# Patient Record
Sex: MALE | Race: WHITE | NOT HISPANIC OR LATINO | Employment: FULL TIME | URBAN - METROPOLITAN AREA
[De-identification: names, ages, dates, MRNs, and addresses within clinical notes are randomized per-mention and may not be internally consistent; named-entity substitution may affect disease eponyms.]

---

## 2019-01-04 ENCOUNTER — TRANSFERRED RECORDS (OUTPATIENT)
Dept: HEALTH INFORMATION MANAGEMENT | Facility: CLINIC | Age: 44
End: 2019-01-04

## 2019-01-07 ENCOUNTER — TRANSFERRED RECORDS (OUTPATIENT)
Dept: HEALTH INFORMATION MANAGEMENT | Facility: CLINIC | Age: 44
End: 2019-01-07

## 2019-01-14 ENCOUNTER — TRANSFERRED RECORDS (OUTPATIENT)
Dept: HEALTH INFORMATION MANAGEMENT | Facility: CLINIC | Age: 44
End: 2019-01-14

## 2019-02-06 ENCOUNTER — TRANSFERRED RECORDS (OUTPATIENT)
Dept: HEALTH INFORMATION MANAGEMENT | Facility: CLINIC | Age: 44
End: 2019-02-06

## 2019-02-08 ENCOUNTER — MEDICAL CORRESPONDENCE (OUTPATIENT)
Dept: HEALTH INFORMATION MANAGEMENT | Facility: CLINIC | Age: 44
End: 2019-02-08

## 2019-02-19 NOTE — TELEPHONE ENCOUNTER
Action    Action Taken Records received from [facility] via fax - 32 pages. Records forwarded to MS gokul Gonzales.

## 2019-02-19 NOTE — TELEPHONE ENCOUNTER
RECORDS RECEIVED FROM: External - Dr. Angel Kirkland - [facility]   DATE RECEIVED: 3/5/19   NOTES (FOR ALL VISITS) STATUS DETAILS   OFFICE NOTE from referring provider Received 1/2/19 1/7/19   OFFICE NOTE from other specialist N/A    DISCHARGE SUMMARY from hospital N/A    DISCHARGE REPORT from the ER N/A    OPERATIVE REPORT N/A    MEDICATION LIST Received    IMAGING  (FOR ALL VISITS)     EMG N/A    EEG N/A    ECT N/A    MRI (HEAD, NECK, SPINE) In Process (images)  Received (reports) MRI Head 1/4/19  MRI Lumbar Spine 1/14/19  MRI Thoracic Spine 1/14/19  MRI Cervical Spine 1/14/19   CT (HEAD, NECK, SPINE) N/A      Phone Call:     Contact Name [facility] Medical Records   Outcome Records were faxed to our referrals department on 2/8 per . Requested to re-fax the records to the clinic instead.      Requested images be mailed. Was told to fax in a cover sheet with the request to [phone]. Faxed in request.

## 2019-02-25 NOTE — TELEPHONE ENCOUNTER
Imaging Received     Image Type (x): Disc: X  PACS:    Exam Date/Name MRI Head 1/4/19  MRI Lumbar Spine 1/14/19  MRI Thoracic Spine 1/14/19  MRI Cervical Spine 1/14/19 Comments: Imaging disk received via mail. Sent to scanning.

## 2019-03-05 ENCOUNTER — PRE VISIT (OUTPATIENT)
Dept: NEUROLOGY | Facility: CLINIC | Age: 44
End: 2019-03-05

## 2019-03-05 ENCOUNTER — OFFICE VISIT (OUTPATIENT)
Dept: NEUROLOGY | Facility: CLINIC | Age: 44
End: 2019-03-05
Attending: PSYCHIATRY & NEUROLOGY
Payer: COMMERCIAL

## 2019-03-05 VITALS — SYSTOLIC BLOOD PRESSURE: 135 MMHG | WEIGHT: 204.3 LBS | HEART RATE: 84 BPM | DIASTOLIC BLOOD PRESSURE: 83 MMHG

## 2019-03-05 DIAGNOSIS — H47.9 UNSPECIFIED DISORDER OF VISUAL PATHWAYS: ICD-10-CM

## 2019-03-05 DIAGNOSIS — H47.9 UNSPECIFIED DISORDER OF VISUAL PATHWAYS: Primary | ICD-10-CM

## 2019-03-05 DIAGNOSIS — M79.2 NEUROPATHIC PAIN: ICD-10-CM

## 2019-03-05 DIAGNOSIS — R93.0 ABNORMAL MRI OF HEAD: ICD-10-CM

## 2019-03-05 LAB
ALBUMIN SERPL-MCNC: 4 G/DL (ref 3.4–5)
ALP SERPL-CCNC: 73 U/L (ref 40–150)
ALT SERPL W P-5'-P-CCNC: 35 U/L (ref 0–70)
ANION GAP SERPL CALCULATED.3IONS-SCNC: 6 MMOL/L (ref 3–14)
AST SERPL W P-5'-P-CCNC: 22 U/L (ref 0–45)
BASOPHILS # BLD AUTO: 0 10E9/L (ref 0–0.2)
BASOPHILS NFR BLD AUTO: 0.5 %
BILIRUB SERPL-MCNC: 0.4 MG/DL (ref 0.2–1.3)
BUN SERPL-MCNC: 18 MG/DL (ref 7–30)
CALCIUM SERPL-MCNC: 9 MG/DL (ref 8.5–10.1)
CHLORIDE SERPL-SCNC: 105 MMOL/L (ref 94–109)
CK SERPL-CCNC: 97 U/L (ref 30–300)
CO2 SERPL-SCNC: 28 MMOL/L (ref 20–32)
CREAT SERPL-MCNC: 1.06 MG/DL (ref 0.66–1.25)
CRP SERPL-MCNC: <2.9 MG/L (ref 0–8)
DIFFERENTIAL METHOD BLD: NORMAL
EOSINOPHIL # BLD AUTO: 0.1 10E9/L (ref 0–0.7)
EOSINOPHIL NFR BLD AUTO: 1.3 %
ERYTHROCYTE [DISTWIDTH] IN BLOOD BY AUTOMATED COUNT: 12.5 % (ref 10–15)
ERYTHROCYTE [SEDIMENTATION RATE] IN BLOOD BY WESTERGREN METHOD: 4 MM/H (ref 0–15)
GFR SERPL CREATININE-BSD FRML MDRD: 84 ML/MIN/{1.73_M2}
GLUCOSE SERPL-MCNC: 86 MG/DL (ref 70–99)
HBV CORE AB SERPL QL IA: NONREACTIVE
HBV SURFACE AB SERPL IA-ACNC: 4.21 M[IU]/ML
HBV SURFACE AG SERPL QL IA: NONREACTIVE
HCT VFR BLD AUTO: 45.8 % (ref 40–53)
HGB BLD-MCNC: 14.9 G/DL (ref 13.3–17.7)
HIV 1+2 AB+HIV1 P24 AG SERPL QL IA: NONREACTIVE
IMM GRANULOCYTES # BLD: 0 10E9/L (ref 0–0.4)
IMM GRANULOCYTES NFR BLD: 0.2 %
LYMPHOCYTES # BLD AUTO: 1.6 10E9/L (ref 0.8–5.3)
LYMPHOCYTES NFR BLD AUTO: 28.1 %
MCH RBC QN AUTO: 30.3 PG (ref 26.5–33)
MCHC RBC AUTO-ENTMCNC: 32.5 G/DL (ref 31.5–36.5)
MCV RBC AUTO: 93 FL (ref 78–100)
MISCELLANEOUS TEST: NORMAL
MONOCYTES # BLD AUTO: 0.3 10E9/L (ref 0–1.3)
MONOCYTES NFR BLD AUTO: 6.2 %
NEUTROPHILS # BLD AUTO: 3.5 10E9/L (ref 1.6–8.3)
NEUTROPHILS NFR BLD AUTO: 63.7 %
NRBC # BLD AUTO: 0 10*3/UL
NRBC BLD AUTO-RTO: 0 /100
PLATELET # BLD AUTO: 215 10E9/L (ref 150–450)
POTASSIUM SERPL-SCNC: 4.4 MMOL/L (ref 3.4–5.3)
PROT SERPL-MCNC: 7.2 G/DL (ref 6.8–8.8)
RBC # BLD AUTO: 4.92 10E12/L (ref 4.4–5.9)
SODIUM SERPL-SCNC: 139 MMOL/L (ref 133–144)
TSH SERPL DL<=0.005 MIU/L-ACNC: 1.29 MU/L (ref 0.4–4)
WBC # BLD AUTO: 5.5 10E9/L (ref 4–11)

## 2019-03-05 PROCEDURE — 84443 ASSAY THYROID STIM HORMONE: CPT | Performed by: PSYCHIATRY & NEUROLOGY

## 2019-03-05 PROCEDURE — 86787 VARICELLA-ZOSTER ANTIBODY: CPT | Performed by: PSYCHIATRY & NEUROLOGY

## 2019-03-05 PROCEDURE — 82784 ASSAY IGA/IGD/IGG/IGM EACH: CPT | Performed by: PSYCHIATRY & NEUROLOGY

## 2019-03-05 PROCEDURE — 82550 ASSAY OF CK (CPK): CPT | Performed by: PSYCHIATRY & NEUROLOGY

## 2019-03-05 PROCEDURE — 82525 ASSAY OF COPPER: CPT | Performed by: PSYCHIATRY & NEUROLOGY

## 2019-03-05 PROCEDURE — 86706 HEP B SURFACE ANTIBODY: CPT | Performed by: PSYCHIATRY & NEUROLOGY

## 2019-03-05 PROCEDURE — 86140 C-REACTIVE PROTEIN: CPT | Performed by: PSYCHIATRY & NEUROLOGY

## 2019-03-05 PROCEDURE — 85652 RBC SED RATE AUTOMATED: CPT | Performed by: PSYCHIATRY & NEUROLOGY

## 2019-03-05 PROCEDURE — 85025 COMPLETE CBC W/AUTO DIFF WBC: CPT | Performed by: PSYCHIATRY & NEUROLOGY

## 2019-03-05 PROCEDURE — 40000975 ZZHCL STATISTIC JC VIR AB INDEX INHIB: Performed by: PSYCHIATRY & NEUROLOGY

## 2019-03-05 PROCEDURE — 86704 HEP B CORE ANTIBODY TOTAL: CPT | Performed by: PSYCHIATRY & NEUROLOGY

## 2019-03-05 PROCEDURE — G0463 HOSPITAL OUTPT CLINIC VISIT: HCPCS

## 2019-03-05 PROCEDURE — 86480 TB TEST CELL IMMUN MEASURE: CPT | Performed by: PSYCHIATRY & NEUROLOGY

## 2019-03-05 PROCEDURE — 87522 HEPATITIS C REVRS TRNSCRPJ: CPT | Performed by: PSYCHIATRY & NEUROLOGY

## 2019-03-05 PROCEDURE — 86255 FLUORESCENT ANTIBODY SCREEN: CPT | Performed by: PSYCHIATRY & NEUROLOGY

## 2019-03-05 PROCEDURE — 36415 COLL VENOUS BLD VENIPUNCTURE: CPT | Performed by: PSYCHIATRY & NEUROLOGY

## 2019-03-05 PROCEDURE — 82164 ANGIOTENSIN I ENZYME TEST: CPT | Performed by: PSYCHIATRY & NEUROLOGY

## 2019-03-05 PROCEDURE — 82085 ASSAY OF ALDOLASE: CPT | Performed by: PSYCHIATRY & NEUROLOGY

## 2019-03-05 PROCEDURE — 87340 HEPATITIS B SURFACE AG IA: CPT | Performed by: PSYCHIATRY & NEUROLOGY

## 2019-03-05 PROCEDURE — 83516 IMMUNOASSAY NONANTIBODY: CPT | Performed by: PSYCHIATRY & NEUROLOGY

## 2019-03-05 PROCEDURE — 84999 UNLISTED CHEMISTRY PROCEDURE: CPT | Performed by: PSYCHIATRY & NEUROLOGY

## 2019-03-05 PROCEDURE — 80053 COMPREHEN METABOLIC PANEL: CPT | Performed by: PSYCHIATRY & NEUROLOGY

## 2019-03-05 PROCEDURE — 83876 ASSAY MYELOPEROXIDASE: CPT | Performed by: PSYCHIATRY & NEUROLOGY

## 2019-03-05 PROCEDURE — 87389 HIV-1 AG W/HIV-1&-2 AB AG IA: CPT | Performed by: PSYCHIATRY & NEUROLOGY

## 2019-03-05 RX ORDER — GABAPENTIN 300 MG/1
300 CAPSULE ORAL DAILY
COMMUNITY
Start: 2019-02-08 | End: 2019-04-02

## 2019-03-05 RX ORDER — DULOXETIN HYDROCHLORIDE 20 MG/1
20 CAPSULE, DELAYED RELEASE ORAL 2 TIMES DAILY
COMMUNITY
Start: 2019-01-02

## 2019-03-05 RX ORDER — PREGABALIN 50 MG/1
50 CAPSULE ORAL AT BEDTIME
Qty: 30 CAPSULE | Refills: 3 | Status: SHIPPED | OUTPATIENT
Start: 2019-03-05 | End: 2019-10-08

## 2019-03-05 RX ORDER — PREGABALIN 50 MG/1
50 CAPSULE ORAL 3 TIMES DAILY
Qty: 30 CAPSULE | Refills: 3 | Status: SHIPPED | OUTPATIENT
Start: 2019-03-05 | End: 2019-03-05

## 2019-03-05 RX ORDER — AMPICILLIN TRIHYDRATE 500 MG
1 CAPSULE ORAL DAILY
COMMUNITY

## 2019-03-05 RX ORDER — IBUPROFEN 800 MG/1
800 TABLET, FILM COATED ORAL 2 TIMES DAILY PRN
COMMUNITY

## 2019-03-05 SDOH — HEALTH STABILITY: MENTAL HEALTH: HOW OFTEN DO YOU HAVE A DRINK CONTAINING ALCOHOL?: NEVER

## 2019-03-05 ASSESSMENT — PAIN SCALES - GENERAL: PAINLEVEL: MODERATE PAIN (4)

## 2019-03-05 NOTE — LETTER
3/5/2019       RE: Adilson Burton  [id number] [facility] [address]     Dear Colleague,    Thank you for referring your patient, Adilson Burton, to the [facility] at [facility]. Please see a copy of my visit note below.    [facility]: [place]    General Neurology Clinic History and Physical    Patient Name:  Adilson Burton  MRN:  [id number]    :  1975  Date of Admission:  (Not on file)  Date of Service:  2019  Primary care provider:  Angel Kirkland    History of Present Illness: 43Mhx of gastric bypass, C4-C6 fusion here for f/u for abnormal MRI. The patient presented initially for diffuse body pains for which an MRI brain was obtained showing an incidental 6.5x9.5mm periventricular T2 hyperintense, T1 hypointense lesion c/f demyelination. The patient was referred here for evaluation. In retrospect, the patient also notes a few years history of ambulation difficulties, namely legs giving out while running and mild imbalance, as well as dropping things with his hands. He says these have gradually come on but that he has never had any discrete episodes of acute onset neurologic symptoms. Over the last few years he has also had some mild L > R stabbing eye pains that seem to be in relation to headaches. His vision has been worsening in both eyes over the last few years as well, which he knows because his prescription keeps changing. He also notes years long hx of fatigue. Other than the above, he denies any neurologic symptoms including bowel/bladder issues, weakness, numbness. For workup, he had a C, T, and L spine MRI series done that per report is unremarkable. We are unable to load the disc here in clinic to view any of his MRIs and only have the reports. He had CSF drawn showing normal basic studies but 8 OCBs with only 2 bands in the serum.     The patient says he did have Lyme disease many years ago that onset  with bullseye rash and severe migraine headache, for which he was treated. He has an identical twin brother who was dx with PPMS in the 1990s that he says developed after an anthrax injection who also f/w Dr. Costello for his PPMS. He quit smoking 2 years ago and is employed as a .     He also was put on GBP for his body pains but says he has had tremors at night so was requesting PGB.    ROS: A 10-point ROS is negative unless indicated above.      Allergies:  No Known Allergies    Medications:      (Not in a hospital admission)    PMH:  History reviewed. No pertinent past medical history.  History reviewed. No pertinent surgical history.    Social History:  Social History     Tobacco Use     Smoking status: Former Smoker     Smokeless tobacco: Never Used   Substance Use Topics     Alcohol use: No     Frequency: Never       Family History:    History reviewed. No pertinent family history.    Physical Examination:   Vitals:  B/P: 135/83, T: Data Unavailable, P: 84, R: Data Unavailable  General:  Sitting up  HEENT:  NC/AT, no icterus, op pink and moist, no ear or nose drainage.   Cardiac:  RRR, no m/r/g  Chest:  CTAB  Abdomen:  S/NT/ND  Extremities:  No LE swelling.    Skin:  No rash or lesion.      Neuro Exam:  Mental status: AOx4, speech fluent  Cranial nerves: EOMI no nystagmus, VFF, face symmetric and equal to pp, voice clear  Motor: 5/5 diffusely including hands.   Reflexes: 2+ diffusely, no spread anywhere, neg hoffmans.   Sensory: loss of pp of BL hands and BL medial lower legs. Neg Romberg  Coordination: FTN intact BL  Gait: normal gait, able to walk HTT    Investigations:  I have reviewed all pertinent investigations.     ==========================================================    ASSESSMENT/PLAN: 43Mhx of gastric bypass, C4-C6 fusion here for f/u for abnormal MRI.    ## possible PPMS: technically, if all other autoimmune conditions are ruled out, then he does meet minimal criteria for PPMS  despite the fact that his symptoms are very mild. We discussed that he has a 31% chance of having MS based on his brother's diagnosis. We will do blood work today to look for other potential causes. He also needs an OCT. Will have the patient f/u with us in one month after blood work for a final diagnosis. We also need to review the MRI.  --upload MRI disc to PAX  --begin pregabalin 50mg at bedtime  --can stop GBP  --labs: aldolase, ACE, CBC, CK, CMP, copper, CRP/ESR, HBV, HCV, IgA, IgG, IgM, VIC, Bueno demyelinating panel, quantiferon, TSH, VZV, vasculitis panel  --OCT of both eyes  --probably needs to see a rheumatologist as much of his pain is not typical for MS and sounds musculoskeletal.   --RTC 1 month    =========================================================    This patient was seen & discussed with my attending, Dr. Costello, who agrees with my assessment and plan.     Dayday Flores  PGY4 Neurology  [phone]    I saw and examined this patient, and have read and edited the resident's note.  I agree with the findings, assessment, and plan.    Sandip Costello  Staff Neurologist   03/05/19

## 2019-03-05 NOTE — NURSING NOTE
Received 2 imaging disk from patient. Send imaging to film room to be uploaded to PACS- disk and forms place in bin to be delivered to film room  Angelita Graff MA

## 2019-03-05 NOTE — PROGRESS NOTES
[facility]: [place]    General Neurology Clinic History and Physical    Patient Name:  Adilson Burton  MRN:  [id number]    :  1975  Date of Admission:  (Not on file)  Date of Service:  2019  Primary care provider:  Angel Kirkland    History of Present Illness: 43Mhx of gastric bypass, C4-C6 fusion here for f/u for abnormal MRI. The patient presented initially for diffuse body pains for which an MRI brain was obtained showing an incidental 6.5x9.5mm periventricular T2 hyperintense, T1 hypointense lesion c/f demyelination. The patient was referred here for evaluation. In retrospect, the patient also notes a few years history of ambulation difficulties, namely legs giving out while running and mild imbalance, as well as dropping things with his hands. He says these have gradually come on but that he has never had any discrete episodes of acute onset neurologic symptoms. Over the last few years he has also had some mild L > R stabbing eye pains that seem to be in relation to headaches. His vision has been worsening in both eyes over the last few years as well, which he knows because his prescription keeps changing. He also notes years long hx of fatigue. Other than the above, he denies any neurologic symptoms including bowel/bladder issues, weakness, numbness. For workup, he had a C, T, and L spine MRI series done that per report is unremarkable. We are unable to load the disc here in clinic to view any of his MRIs and only have the reports. He had CSF drawn showing normal basic studies but 8 OCBs with only 2 bands in the serum.     The patient says he did have Lyme disease many years ago that onset with bullseye rash and severe migraine headache, for which he was treated. He has an identical twin brother who was dx with PPMS in the  that he says developed after an anthrax injection who also f/w Dr. Costello for his PPMS. He quit smoking 2 years ago and is employed as a  .     He also was put on GBP for his body pains but says he has had tremors at night so was requesting PGB.    ROS: A 10-point ROS is negative unless indicated above.      Allergies:  No Known Allergies    Medications:      (Not in a hospital admission)    PMH:  History reviewed. No pertinent past medical history.  History reviewed. No pertinent surgical history.    Social History:  Social History     Tobacco Use     Smoking status: Former Smoker     Smokeless tobacco: Never Used   Substance Use Topics     Alcohol use: No     Frequency: Never       Family History:    History reviewed. No pertinent family history.    Physical Examination:   Vitals:  B/P: 135/83, T: Data Unavailable, P: 84, R: Data Unavailable  General:  Sitting up  HEENT:  NC/AT, no icterus, op pink and moist, no ear or nose drainage.   Cardiac:  RRR, no m/r/g  Chest:  CTAB  Abdomen:  S/NT/ND  Extremities:  No LE swelling.    Skin:  No rash or lesion.      Neuro Exam:  Mental status: AOx4, speech fluent  Cranial nerves: EOMI no nystagmus, VFF, face symmetric and equal to pp, voice clear  Motor: 5/5 diffusely including hands.   Reflexes: 2+ diffusely, no spread anywhere, neg hoffmans.   Sensory: loss of pp of BL hands and BL medial lower legs. Neg Romberg  Coordination: FTN intact BL  Gait: normal gait, able to walk HTT    Investigations:  I have reviewed all pertinent investigations.     ==========================================================    ASSESSMENT/PLAN: 43Mhx of gastric bypass, C4-C6 fusion here for f/u for abnormal MRI.    ## possible PPMS: technically, if all other autoimmune conditions are ruled out, then he does meet minimal criteria for PPMS despite the fact that his symptoms are very mild. We discussed that he has a 31% chance of having MS based on his brother's diagnosis. We will do blood work today to look for other potential causes. He also needs an OCT. Will have the patient f/u with us in one month after blood  work for a final diagnosis. We also need to review the MRI.  --upload MRI disc to PAX  --begin pregabalin 50mg at bedtime  --can stop GBP  --labs: aldolase, ACE, CBC, CK, CMP, copper, CRP/ESR, HBV, HCV, IgA, IgG, IgM, VIC, Bueno demyelinating panel, quantiferon, TSH, VZV, vasculitis panel  --OCT of both eyes  --probably needs to see a rheumatologist as much of his pain is not typical for MS and sounds musculoskeletal.   --RTC 1 month    =========================================================    This patient was seen & discussed with my attending, Dr. Costello, who agrees with my assessment and plan.     Dayday Flores  PGY4 Neurology  [phone]       I saw and examined this patient, and have read and edited the resident's note.  I agree with the findings, assessment, and plan.    Sandip Costello  Staff Neurologist   03/05/19

## 2019-03-05 NOTE — PATIENT INSTRUCTIONS
Stop gabapentin     Start Lyrica    Will get blood work    Get an OCT Ocular Coherence topography    You saw a neurology provider today at the [facility].  You may have also met with the MS RN Care Coordinator.  In order to get a message to your MS Center provider, you should contact [phone] option 3 for the triage nurse line.    You should contact us via this protocol if you have any of the following symptoms:    New or worsening neurologic symptoms that persist for 24-48 hours, such as:  o New onset of pain or marked worsening of pain  o Difficulty with speech, swallowing, or breathing  o New onset of vertigo or dizziness  o Change in bowel or bladder function (incontinence, difficulty urinating)    Increasing difficulty in self care    Marked changes in vision (double vision, blurred vision, graying of vision)    Change in mobility    Change in cognitive function    Falling    Worsening numbness, tingling or pain with a change in function    Worsening fatigue lasting more than 2 weeks  If you had labs completed today, we will contact you with the results.  If you are active in [facility], they will be released to you there.  Otherwise, your results will be provided to you via mail or telephone call.  Some results take up to 2 weeks for completion.  If you haven t heard anything about your lab results within 2 weeks, you can call or send a [facility] [facility] message to obtain your results.  If you have an MRI scheduled in the week or two prior to your next appointment, we will go over the results at your scheduled follow up appointment.  If you are not scheduled to see your MS Center provider within about 2 weeks after your MRI, please call or send a [employer] message to obtain your results if you haven t heard anything within 2 weeks.  Please be aware that it takes at least 5 business days after routine MRIs for your results to be reviewed by both the radiologist and your doctor.  MRIs  completed at facilities outside of the [facility] system take about 2 weeks in order for the MRI disc to be mailed to our clinic and uploaded into your medical record.    Prescription refills should be faxed to us by your pharmacy.  Our fax number for prescription refills is [phone].  Please do your best to come to your appointments, and to arrive 15 minutes early to allow time for checking in.  [facility] reserves the right to terminate care of established patients if a patient misses three or more appointments in a clinic without providing notification within a 12-month period.    Developing Your Care Team  Individuals living with chronic illnesses like MS may be unaware that they are at risk for the same range of medical problems as everyone else.  This is why you must establish a relationship with other health care providers in addition to your Multiple Sclerosis doctor.  It can be difficult at times to figure out whether a health concern is related to your MS, or whether it is related to something else, such as hormonal changes, pseduoexacerbations, changes in your core body temperature, flu-like reactions to interferons, exercise, or infections.  Urinary tract infections (UTIs) are common culprits that can cause fatigue, weakness, or other  MS attack -like symptoms without classic symptoms of a UTI.  For this reason, if you call or come in to discuss symptoms, you may be asked to get in touch with your primary provider or another specialist, so that you receive the comprehensive care you need.  What is Multiple Sclerosis (MS)?  MS is a disease in which the nerve tissues in the brain and/or spinal cord are attacked by immune cells in the body.  These immune cells are present in everyone, and their normal role is to fight off infections.  In people with MS, these cells change the way they function and cross into the nervous system.  Once there, they cause inflammation that damages the  myelin (or the protective coating of a nerve cell, much like the plastic covering on an electrical cord) and parts of the nerve cell itself.  So far, a clear cause for this immune system dysfunction has not been found.  MS often starts out as the  relapsing-remitting  form.  This means there are episodes when you have symptoms, and other times when you recover to normal or near-normal.  Over time, if the damage to the nervous system continues, the disease can cause additional disability, such as difficulty walking.  If the relapses and nerve damage can be prevented with available medications, many patients with MS can go many years between relapses and have relatively little disability.  Remember: MS is a condition that changes and must be evaluated on an ongoing basis!  What is a Relapse? (Also called flare-ups, attacks, or exacerbations)  Relapses are due to the occurrence of inflammation in some part of the brain and/or spinal cord.  A relapse is new or recurrent symptoms which persist for at least 24 hours and sometimes worsen over 48 hours.  New symptoms need to be  by at least 1 month in order to be considered separate relapses. Most of the time, symptoms reach their maximal intensity within 2 weeks and then begin to slowly resolve.  At times, your symptoms may not recover fully for up to 6 months, depending on the severity of the episode.  The frequency of relapses is generally higher early in the disease, but can vary greatly among individuals with MS.  Improvement of symptoms for an individual is unpredictable with each relapse.    It is important to remember that an increase in symptoms and changes in function may not necessarily be a relapse.  There are other factors that contribute to such changes, such as hot weather, increased body temperature, infection/illness, stress and sleep deprivation.  The worsening of symptoms may feel like a relapse when in reality it is not.  These episodes are  referred to as pseudorelapses.  Once the underlying cause is addressed, symptoms usually fade away and you feel better.  If you experience a worsening of symptoms that lasts more than 48 hours and does not improve with cooling down, decreasing stress, or treatment of an infection, please call us and we can help to better determine whether you are having a pseudorelapse versus a relapse.

## 2019-03-06 LAB
ACE SERPL-CCNC: 12 U/L (ref 9–67)
ALDOLASE SERPL-CCNC: 3.5 U/L (ref 1.5–8.1)
IGA SERPL-MCNC: 195 MG/DL (ref 70–380)
IGG SERPL-MCNC: 925 MG/DL (ref 695–1620)
IGM SERPL-MCNC: 83 MG/DL (ref 60–265)
MYELOPEROXIDASE AB SER-ACNC: <0.2 AI (ref 0–0.9)
PROTEINASE3 IGG SER-ACNC: <0.2 AI (ref 0–0.9)
VZV IGG SER QL IA: 6.1 AI (ref 0–0.8)

## 2019-03-07 LAB
COPPER SERPL-MCNC: 72 UG/DL (ref 70–140)
HCV RNA SERPL NAA+PROBE-ACNC: <15 [IU]/ML
HCV RNA SERPL NAA+PROBE-LOG IU: <1.2 LOG IU/ML

## 2019-03-08 LAB
GAMMA INTERFERON BACKGROUND BLD IA-ACNC: 0.03 IU/ML
M TB IFN-G BLD-IMP: NEGATIVE
M TB IFN-G CD4+ BCKGRND COR BLD-ACNC: 7.68 IU/ML
MITOGEN IGNF BCKGRD COR BLD-ACNC: 0 IU/ML
MITOGEN IGNF BCKGRD COR BLD-ACNC: 0 IU/ML

## 2019-03-11 LAB — LAB SCANNED RESULT: ABNORMAL

## 2019-03-12 LAB
RESULT: NORMAL
SEND OUTS MISC TEST CODE: NORMAL
SEND OUTS MISC TEST SPECIMEN: NORMAL
TEST NAME: NORMAL

## 2019-04-02 ENCOUNTER — OFFICE VISIT (OUTPATIENT)
Dept: NEUROLOGY | Facility: CLINIC | Age: 44
End: 2019-04-02
Attending: PSYCHIATRY & NEUROLOGY
Payer: COMMERCIAL

## 2019-04-02 VITALS
BODY MASS INDEX: 31.31 KG/M2 | HEIGHT: 68 IN | WEIGHT: 206.6 LBS | HEART RATE: 82 BPM | SYSTOLIC BLOOD PRESSURE: 133 MMHG | DIASTOLIC BLOOD PRESSURE: 82 MMHG

## 2019-04-02 DIAGNOSIS — G35 MULTIPLE SCLEROSIS (H): Primary | ICD-10-CM

## 2019-04-02 PROCEDURE — G0463 HOSPITAL OUTPT CLINIC VISIT: HCPCS | Mod: ZF

## 2019-04-02 RX ORDER — PREGABALIN 25 MG/1
CAPSULE ORAL
Qty: 240 CAPSULE | Refills: 3 | Status: SHIPPED | OUTPATIENT
Start: 2019-04-02 | End: 2019-10-08

## 2019-04-02 ASSESSMENT — MIFFLIN-ST. JEOR: SCORE: 1788.69

## 2019-04-02 ASSESSMENT — PAIN SCALES - GENERAL: PAINLEVEL: MODERATE PAIN (4)

## 2019-04-02 NOTE — PATIENT INSTRUCTIONS
Increase the dose of lyrica by 25mg every three days as tolerated until you either reach of dose of no symptoms or you hit 100mg twice daily    Call me a month in about the lyrica.    Call me if you decide to go the medication     Follow up in 6 months    You saw a neurology provider today at the [facility].  You may have also met with the MS RN Care Coordinator.  In order to get a message to your MS Center provider, you should contact [phone] option 3 for the triage nurse line.    You should contact us via this protocol if you have any of the following symptoms:    New or worsening neurologic symptoms that persist for 24-48 hours, such as:  o New onset of pain or marked worsening of pain  o Difficulty with speech, swallowing, or breathing  o New onset of vertigo or dizziness  o Change in bowel or bladder function (incontinence, difficulty urinating)    Increasing difficulty in self care    Marked changes in vision (double vision, blurred vision, graying of vision)    Change in mobility    Change in cognitive function    Falling    Worsening numbness, tingling or pain with a change in function    Worsening fatigue lasting more than 2 weeks  If you had labs completed today, we will contact you with the results.  If you are active in [employer], they will be released to you there.  Otherwise, your results will be provided to you via mail or telephone call.  Some results take up to 2 weeks for completion.  If you haven t heard anything about your lab results within 2 weeks, you can call or send a [employer] message to obtain your results.  If you have an MRI scheduled in the week or two prior to your next appointment, we will go over the results at your scheduled follow up appointment.  If you are not scheduled to see your MS Center provider within about 2 weeks after your MRI, please call or send a [employer] message to obtain your results if you haven t heard anything within 2  weeks.  Please be aware that it takes at least 5 business days after routine MRIs for your results to be reviewed by both the radiologist and your doctor.  MRIs completed at facilities outside of the [facility] system take about 2 weeks in order for the MRI disc to be mailed to our clinic and uploaded into your medical record.    Prescription refills should be faxed to us by your pharmacy.  Our fax number for prescription refills is [phone].  Please do your best to come to your appointments, and to arrive 15 minutes early to allow time for checking in.  [facility] reserves the right to terminate care of established patients if a patient misses three or more appointments in a clinic without providing notification within a 12-month period.    Developing Your Care Team  Individuals living with chronic illnesses like MS may be unaware that they are at risk for the same range of medical problems as everyone else.  This is why you must establish a relationship with other health care providers in addition to your Multiple Sclerosis doctor.  It can be difficult at times to figure out whether a health concern is related to your MS, or whether it is related to something else, such as hormonal changes, pseduoexacerbations, changes in your core body temperature, flu-like reactions to interferons, exercise, or infections.  Urinary tract infections (UTIs) are common culprits that can cause fatigue, weakness, or other  MS attack -like symptoms without classic symptoms of a UTI.  For this reason, if you call or come in to discuss symptoms, you may be asked to get in touch with your primary provider or another specialist, so that you receive the comprehensive care you need.  What is Multiple Sclerosis (MS)?  MS is a disease in which the nerve tissues in the brain and/or spinal cord are attacked by immune cells in the body.  These immune cells are present in everyone, and their normal role is to fight off  infections.  In people with MS, these cells change the way they function and cross into the nervous system.  Once there, they cause inflammation that damages the myelin (or the protective coating of a nerve cell, much like the plastic covering on an electrical cord) and parts of the nerve cell itself.  So far, a clear cause for this immune system dysfunction has not been found.  MS often starts out as the  relapsing-remitting  form.  This means there are episodes when you have symptoms, and other times when you recover to normal or near-normal.  Over time, if the damage to the nervous system continues, the disease can cause additional disability, such as difficulty walking.  If the relapses and nerve damage can be prevented with available medications, many patients with MS can go many years between relapses and have relatively little disability.  Remember: MS is a condition that changes and must be evaluated on an ongoing basis!  What is a Relapse? (Also called flare-ups, attacks, or exacerbations)  Relapses are due to the occurrence of inflammation in some part of the brain and/or spinal cord.  A relapse is new or recurrent symptoms which persist for at least 24 hours and sometimes worsen over 48 hours.  New symptoms need to be  by at least 1 month in order to be considered separate relapses. Most of the time, symptoms reach their maximal intensity within 2 weeks and then begin to slowly resolve.  At times, your symptoms may not recover fully for up to 6 months, depending on the severity of the episode.  The frequency of relapses is generally higher early in the disease, but can vary greatly among individuals with MS.  Improvement of symptoms for an individual is unpredictable with each relapse.    It is important to remember that an increase in symptoms and changes in function may not necessarily be a relapse.  There are other factors that contribute to such changes, such as hot weather, increased body  temperature, infection/illness, stress and sleep deprivation.  The worsening of symptoms may feel like a relapse when in reality it is not.  These episodes are referred to as pseudorelapses.  Once the underlying cause is addressed, symptoms usually fade away and you feel better.  If you experience a worsening of symptoms that lasts more than 48 hours and does not improve with cooling down, decreasing stress, or treatment of an infection, please call us and we can help to better determine whether you are having a pseudorelapse versus a relapse.

## 2019-04-02 NOTE — LETTER
4/2/2019       RE: Adilson Burton  [id number] [facility] [id number]     Dear Colleague,    Thank you for referring your patient, Adilson Burton, to the [facility] MULTIPLE SCLEROSIS at [facility]. Please see a copy of my visit note below.    MULTIPLE SCLEROSIS CLINIC AT THE [facility]  FOLLOWUP/ESTABLISHED PATIENT VISIT    PRINCIPAL NEUROLOGIC DIAGNOSIS: Multiple Sclerosis    Date of Onset: 2014  Date of Diagnosis: 3/2019  Initial Clinical Course: Primary progressive  Current Clinical Course: Primary progresssive  Past Disease Modifying Therapy(ies): NA  Current Disease Modifying Therapy(ies):NA  Most Recent MRI of the Brain: 1/14/19  Most Recent MRI of the Cervical Cord 1/14/19  Most Recent MRI of the Thoracic Cord: 1/14/19  Most Recent Lumbar Puncture: 1/29/19  Most Recent OCT: NA   Most Recent JCV: 3/5/19 positive  Most Recent Remote Hepatitis Panel: 3/5/19 negative  Most Recent VZV IgG: 3/5/19 positive  Most Recent TB Quant: 3/5/19 negative    CHIEF COMPLAINT: Review of diagnostic testing    INTERVAL HISTORY:    Overall unchanged    Issues with current MS therapy: Not on DMT    REVIEW OF SYSTEMS:    Mood: unchanged  Spasticity:none  Bladder: none  Bowel: unchanged  Pain related to today's visit:reviewed on nursing intake documentation  Fatigue: unchanged  Sleep: well/ no problem  Memory/Concentration: unchanged    Otherwise 10 point ROS was neg other than the symptoms noted above.    PAST HISTORY was reviewed and updated:    MEDICATIONS and ALLERGIES were reviewed and updated.    SOCIAL HISTORY was reviewed and updated:    EXAM:    PHYSICAL EXAMINATION:   VITAL SIGNS:  B/P: 133/82, T: Data Unavailable, P: 82, R: Data Unavailable    GENERAL: The patient is a well-nourished  who presents to the evaluation with his wife.  NEUROLOGIC:   MENTAL STATUS: Alert,awake and  oriented times four.     GAIT: Gait is   narrow-based and steady  RESULTS:  Monitoring  labs:    Orders Only on 03/05/2019   Component Date Value Ref Range Status     CRP Inflammation 03/05/2019 <2.9  0.0 - 8.0 mg/L Final     Sed Rate 03/05/2019 4  0 - 15 mm/h Final     CK Total 03/05/2019 97  30 - 300 U/L Final     IGM 03/05/2019 83  60 - 265 mg/dL Final     IGG 03/05/2019 925  695 - 1,620 mg/dL Final     IGA 03/05/2019 195  70 - 380 mg/dL Final   Office Visit on 03/05/2019   Component Date Value Ref Range Status     Angiotensin Converting Enzyme 03/05/2019 12  9 - 67 U/L Final     WBC 03/05/2019 5.5  4.0 - 11.0 10e9/L Final     RBC Count 03/05/2019 4.92  4.4 - 5.9 10e12/L Final     Hemoglobin 03/05/2019 14.9  13.3 - 17.7 g/dL Final     Hematocrit 03/05/2019 45.8  40.0 - 53.0 % Final     MCV 03/05/2019 93  78 - 100 fl Final     MCH 03/05/2019 30.3  26.5 - 33.0 pg Final     MCHC 03/05/2019 32.5  31.5 - 36.5 g/dL Final     RDW 03/05/2019 12.5  10.0 - 15.0 % Final     Platelet Count 03/05/2019 215  150 - 450 10e9/L Final     Diff Method 03/05/2019 Automated Method   Final     % Neutrophils 03/05/2019 63.7  % Final     % Lymphocytes 03/05/2019 28.1  % Final     % Monocytes 03/05/2019 6.2  % Final     % Eosinophils 03/05/2019 1.3  % Final     % Basophils 03/05/2019 0.5  % Final     % Immature Granulocytes 03/05/2019 0.2  % Final     Nucleated RBCs 03/05/2019 0  0 /100 Final     Absolute Neutrophil 03/05/2019 3.5  1.6 - 8.3 10e9/L Final     Absolute Lymphocytes 03/05/2019 1.6  0.8 - 5.3 10e9/L Final     Absolute Monocytes 03/05/2019 0.3  0.0 - 1.3 10e9/L Final     Absolute Eosinophils 03/05/2019 0.1  0.0 - 0.7 10e9/L Final     Absolute Basophils 03/05/2019 0.0  0.0 - 0.2 10e9/L Final     Abs Immature Granulocytes 03/05/2019 0.0  0 - 0.4 10e9/L Final     Absolute Nucleated RBC 03/05/2019 0.0   Final     Sodium 03/05/2019 139  133 - 144 mmol/L Final     Potassium 03/05/2019 4.4  3.4 - 5.3 mmol/L Final     Chloride 03/05/2019 105  94 - 109 mmol/L Final     Carbon Dioxide 03/05/2019 28  20 - 32 mmol/L Final      Anion Gap 03/05/2019 6  3 - 14 mmol/L Final     Glucose 03/05/2019 86  70 - 99 mg/dL Final     Urea Nitrogen 03/05/2019 18  7 - 30 mg/dL Final     Creatinine 03/05/2019 1.06  0.66 - 1.25 mg/dL Final     GFR Estimate 03/05/2019 84  >60 mL/min/[1.73_m2] Final     GFR Estimate If Black 03/05/2019 >90  >60 mL/min/[1.73_m2] Final     Calcium 03/05/2019 9.0  8.5 - 10.1 mg/dL Final     Bilirubin Total 03/05/2019 0.4  0.2 - 1.3 mg/dL Final     Albumin 03/05/2019 4.0  3.4 - 5.0 g/dL Final     Protein Total 03/05/2019 7.2  6.8 - 8.8 g/dL Final     Alkaline Phosphatase 03/05/2019 73  40 - 150 U/L Final     ALT 03/05/2019 35  0 - 70 U/L Final     AST 03/05/2019 22  0 - 45 U/L Final     Hepatitis B Core Lotus 03/05/2019 Nonreactive  NR^Nonreactive Final     Hepatitis B Surface Antibody 03/05/2019 4.21  <8.00 m[IU]/mL Final     Copper 03/05/2019 72  70 - 140 ug/dL Final     Hep B Surface Agn 03/05/2019 Nonreactive  NR^Nonreactive Final     HCV RNA Quant IU/ml 03/05/2019 <15* HCVND^HCV RNA Not Detected [IU]/mL Final     Log of HCV RNA Qt 03/05/2019 <1.2  <1.2 Log IU/mL Final     HIV Antigen Antibody Combo 03/05/2019 Nonreactive  NR^Nonreactive     Final     Lab Scanned Result 03/05/2019 VIC VIR AB INDEX REFLEX-Scanned*  Final     Miscellaneous Test 03/05/2019      Final                    Value:Specimen Received, Reordered and sent to Performing laboratory - Report to follow upon   completion.       Quantiferon-TB Gold Plus Result 03/05/2019 Negative  NEG^Negative Final     TB1 Ag minus Nil Value 03/05/2019 0.00  IU/mL Final     TB2 Ag minus Nil Value 03/05/2019 0.00  IU/mL Final     Mitogen minus Nil Result 03/05/2019 7.68  IU/mL Final     Nil Result 03/05/2019 0.03  IU/mL Final     TSH 03/05/2019 1.29  0.40 - 4.00 mU/L Final     Varicella Zoster Virus Antibody IgG 03/05/2019 6.1* 0.0 - 0.8 AI Final     Myeloperoxidase Antibody IgG 03/05/2019 <0.2  0.0 - 0.9 AI Final     Proteinase 3 Antibody IgG 03/05/2019 <0.2  0.0 - 0.9 AI Final      Aldolase 03/05/2019 3.5  1.5 - 8.1 U/L Final     Result 03/05/2019 SEE NOTE 03/12/2019 08:13 AM   Final     Test Name 03/05/2019 CNS Demyelinating Disease Evaluation, Serum   Final     Send Outs Misc Test Code 03/05/2019 CDS1   Final     Send Outs Misc Test Specimen 03/05/2019 Serum   Final       MRI brain:    no new MRI to review    ASSESSMENT/PLAN:  The patient is a 43-year-old gentleman with a past medical history of newly diagnosed primary progressive multiple sclerosis who is presenting today as a follow-up.  Overall, there has been no better explanation for his symptoms, and he does not meet the Naidu criteria.  Consequently, I spent a prolonged period of time discussing treatment options with the patient.  The patient was not excited about starting a medication.  However, he would take the information we will read about it and make a decision later on.  I warned him of the risk and benefits of Ocrevus if he could choose to do this.  I also spent time discussing with the patient how to treat his pain.  I will have the patient up titrate his Lyrica until he gets 200 mg twice daily.  I once again warned him of the side effects also medication.  I instructed him to call my office with any Questions, concerns, issues or problems.  I will tentatively follow him up in 6 months.    Increase Lyrica by 25 mg every 3 days until he reaches a dose of 100 mg twice daily  Consider starting on ocrelizumab  Follow-up in 6 months.  I spent 45 minutes in this visit, with >50% direct patient time spent counseling about prognosis, treatment options, and coordination of care.    Sandip Costello MD [facility]  Staff Neurologist   04/02/19     (Chart documentation was completed in part with Dragon voice-recognition software. Even though reviewed, some grammatical, spelling, and word errors may remain.)

## 2019-04-02 NOTE — PROGRESS NOTES
MULTIPLE SCLEROSIS CLINIC AT THE [facility]  FOLLOWUP/ESTABLISHED PATIENT VISIT      PRINCIPAL NEUROLOGIC DIAGNOSIS: Multiple Sclerosis    Date of Onset: 2014  Date of Diagnosis: 3/2019  Initial Clinical Course: Primary progressive  Current Clinical Course: Primary progresssive  Past Disease Modifying Therapy(ies): NA  Current Disease Modifying Therapy(ies):NA  Most Recent MRI of the Brain: 1/14/19  Most Recent MRI of the Cervical Cord 1/14/19  Most Recent MRI of the Thoracic Cord: 1/14/19  Most Recent Lumbar Puncture: 1/29/19  Most Recent OCT: NA   Most Recent JCV: 3/5/19 positive  Most Recent Remote Hepatitis Panel: 3/5/19 negative  Most Recent VZV IgG: 3/5/19 positive  Most Recent TB Quant: 3/5/19 negative      CHIEF COMPLAINT: Review of diagnostic testing      INTERVAL HISTORY:    Overall unchanged    Issues with current MS therapy: Not on DMT    REVIEW OF SYSTEMS:    Mood: unchanged  Spasticity:none  Bladder: none  Bowel: unchanged  Pain related to today's visit:reviewed on nursing intake documentation  Fatigue: unchanged  Sleep: well/ no problem  Memory/Concentration: unchanged      Otherwise 10 point ROS was neg other than the symptoms noted above.    PAST HISTORY was reviewed and updated:      MEDICATIONS and ALLERGIES were reviewed and updated.    SOCIAL HISTORY was reviewed and updated:        EXAM:    PHYSICAL EXAMINATION:   VITAL SIGNS:  B/P: 133/82, T: Data Unavailable, P: 82, R: Data Unavailable    GENERAL: The patient is a well-nourished  who presents to the evaluation with his wife.  NEUROLOGIC:   MENTAL STATUS: Alert,awake and  oriented times four.     GAIT: Gait is   narrow-based and steady  RESULTS:  Monitoring labs:    Orders Only on 03/05/2019   Component Date Value Ref Range Status     CRP Inflammation 03/05/2019 <2.9  0.0 - 8.0 mg/L Final     Sed Rate 03/05/2019 4  0 - 15 mm/h Final     CK Total 03/05/2019 97  30 - 300 U/L Final     IGM 03/05/2019 83  60 - 265 mg/dL Final     IGG  03/05/2019 925  695 - 1,620 mg/dL Final     IGA 03/05/2019 195  70 - 380 mg/dL Final   Office Visit on 03/05/2019   Component Date Value Ref Range Status     Angiotensin Converting Enzyme 03/05/2019 12  9 - 67 U/L Final     WBC 03/05/2019 5.5  4.0 - 11.0 10e9/L Final     RBC Count 03/05/2019 4.92  4.4 - 5.9 10e12/L Final     Hemoglobin 03/05/2019 14.9  13.3 - 17.7 g/dL Final     Hematocrit 03/05/2019 45.8  40.0 - 53.0 % Final     MCV 03/05/2019 93  78 - 100 fl Final     MCH 03/05/2019 30.3  26.5 - 33.0 pg Final     MCHC 03/05/2019 32.5  31.5 - 36.5 g/dL Final     RDW 03/05/2019 12.5  10.0 - 15.0 % Final     Platelet Count 03/05/2019 215  150 - 450 10e9/L Final     Diff Method 03/05/2019 Automated Method   Final     % Neutrophils 03/05/2019 63.7  % Final     % Lymphocytes 03/05/2019 28.1  % Final     % Monocytes 03/05/2019 6.2  % Final     % Eosinophils 03/05/2019 1.3  % Final     % Basophils 03/05/2019 0.5  % Final     % Immature Granulocytes 03/05/2019 0.2  % Final     Nucleated RBCs 03/05/2019 0  0 /100 Final     Absolute Neutrophil 03/05/2019 3.5  1.6 - 8.3 10e9/L Final     Absolute Lymphocytes 03/05/2019 1.6  0.8 - 5.3 10e9/L Final     Absolute Monocytes 03/05/2019 0.3  0.0 - 1.3 10e9/L Final     Absolute Eosinophils 03/05/2019 0.1  0.0 - 0.7 10e9/L Final     Absolute Basophils 03/05/2019 0.0  0.0 - 0.2 10e9/L Final     Abs Immature Granulocytes 03/05/2019 0.0  0 - 0.4 10e9/L Final     Absolute Nucleated RBC 03/05/2019 0.0   Final     Sodium 03/05/2019 139  133 - 144 mmol/L Final     Potassium 03/05/2019 4.4  3.4 - 5.3 mmol/L Final     Chloride 03/05/2019 105  94 - 109 mmol/L Final     Carbon Dioxide 03/05/2019 28  20 - 32 mmol/L Final     Anion Gap 03/05/2019 6  3 - 14 mmol/L Final     Glucose 03/05/2019 86  70 - 99 mg/dL Final     Urea Nitrogen 03/05/2019 18  7 - 30 mg/dL Final     Creatinine 03/05/2019 1.06  0.66 - 1.25 mg/dL Final     GFR Estimate 03/05/2019 84  >60 mL/min/[1.73_m2] Final     GFR Estimate If  Black 03/05/2019 >90  >60 mL/min/[1.73_m2] Final     Calcium 03/05/2019 9.0  8.5 - 10.1 mg/dL Final     Bilirubin Total 03/05/2019 0.4  0.2 - 1.3 mg/dL Final     Albumin 03/05/2019 4.0  3.4 - 5.0 g/dL Final     Protein Total 03/05/2019 7.2  6.8 - 8.8 g/dL Final     Alkaline Phosphatase 03/05/2019 73  40 - 150 U/L Final     ALT 03/05/2019 35  0 - 70 U/L Final     AST 03/05/2019 22  0 - 45 U/L Final     Hepatitis B Core Lotus 03/05/2019 Nonreactive  NR^Nonreactive Final     Hepatitis B Surface Antibody 03/05/2019 4.21  <8.00 m[IU]/mL Final     Copper 03/05/2019 72  70 - 140 ug/dL Final     Hep B Surface Agn 03/05/2019 Nonreactive  NR^Nonreactive Final     HCV RNA Quant IU/ml 03/05/2019 <15* HCVND^HCV RNA Not Detected [IU]/mL Final     Log of HCV RNA Qt 03/05/2019 <1.2  <1.2 Log IU/mL Final     HIV Antigen Antibody Combo 03/05/2019 Nonreactive  NR^Nonreactive     Final     Lab Scanned Result 03/05/2019 IVC VIR AB INDEX REFLEX-Scanned*  Final     Miscellaneous Test 03/05/2019      Final                    Value:Specimen Received, Reordered and sent to Performing laboratory - Report to follow upon   completion.       Quantiferon-TB Gold Plus Result 03/05/2019 Negative  NEG^Negative Final     TB1 Ag minus Nil Value 03/05/2019 0.00  IU/mL Final     TB2 Ag minus Nil Value 03/05/2019 0.00  IU/mL Final     Mitogen minus Nil Result 03/05/2019 7.68  IU/mL Final     Nil Result 03/05/2019 0.03  IU/mL Final     TSH 03/05/2019 1.29  0.40 - 4.00 mU/L Final     Varicella Zoster Virus Antibody IgG 03/05/2019 6.1* 0.0 - 0.8 AI Final     Myeloperoxidase Antibody IgG 03/05/2019 <0.2  0.0 - 0.9 AI Final     Proteinase 3 Antibody IgG 03/05/2019 <0.2  0.0 - 0.9 AI Final     Aldolase 03/05/2019 3.5  1.5 - 8.1 U/L Final     Result 03/05/2019 SEE NOTE 03/12/2019 08:13 AM   Final     Test Name 03/05/2019 CNS Demyelinating Disease Evaluation, Serum   Final     Send Outs Misc Test Code 03/05/2019 CDS1   Final     Send Outs Misc Test Specimen  03/05/2019 Serum   Final   ]      MRI brain:    no new MRI to review    ASSESSMENT/PLAN:  The patient is a 43-year-old gentleman with a past medical history of newly diagnosed primary progressive multiple sclerosis who is presenting today as a follow-up.  Overall, there has been no better explanation for his symptoms, and he does not meet the Naidu criteria.  Consequently, I spent a prolonged period of time discussing treatment options with the patient.  The patient was not excited about starting a medication.  However, he would take the information we will read about it and make a decision later on.  I warned him of the risk and benefits of Ocrevus if he could choose to do this.  I also spent time discussing with the patient how to treat his pain.  I will have the patient up titrate his Lyrica until he gets 200 mg twice daily.  I once again warned him of the side effects also medication.  I instructed him to call my office with any Questions, concerns, issues or problems.  I will tentatively follow him up in 6 months.    Increase Lyrica by 25 mg every 3 days until he reaches a dose of 100 mg twice daily  Consider starting on ocrelizumab  Follow-up in 6 months.  I spent 45 minutes in this visit, with >50% direct patient time spent counseling about prognosis, treatment options, and coordination of care.    Sandip Costello MD [facility]  Staff Neurologist   04/02/19       (Chart documentation was completed in part with Dragon voice-recognition software. Even though reviewed, some grammatical, spelling, and word errors may remain.)

## 2019-08-06 ENCOUNTER — MYC MEDICAL ADVICE (OUTPATIENT)
Dept: NEUROLOGY | Facility: CLINIC | Age: 44
End: 2019-08-06

## 2019-08-06 DIAGNOSIS — R29.898 WEAKNESS OF HAND: ICD-10-CM

## 2019-08-06 DIAGNOSIS — R93.0 ABNORMAL MRI OF HEAD: Primary | ICD-10-CM

## 2019-08-06 NOTE — TELEPHONE ENCOUNTER
Per Dr. Costello:   I order it without contrast because I do not think it is needed. At this point. It wont add anything          MyChart message sent to patient.

## 2019-08-06 NOTE — TELEPHONE ENCOUNTER
Dr. Costello, please see below. Patient is scheduled to f/u with you 10/1. He is reporting some symptoms and is requesting MRI imaging prior to his appointment (which appears to need rescheduling). Please advise.

## 2019-08-06 NOTE — TELEPHONE ENCOUNTER
Hi    Order is placed.    Sincerely  Sandip Costello MD [facility]  Staff Neurologist   08/06/19

## 2019-08-06 NOTE — TELEPHONE ENCOUNTER
[employer] message sent to patient.   Request sent to Clinic Coordinator to contact patient to schedule MRI and f/u with Dr. Costello.

## 2019-08-06 NOTE — TELEPHONE ENCOUNTER
Dr. Costello, please see the patient's response below. He is questioning if he should have MRI with contrast; thoughts?

## 2019-10-08 ENCOUNTER — ANCILLARY PROCEDURE (OUTPATIENT)
Dept: MRI IMAGING | Facility: CLINIC | Age: 44
End: 2019-10-08
Attending: PSYCHIATRY & NEUROLOGY
Payer: COMMERCIAL

## 2019-10-08 ENCOUNTER — APPOINTMENT (OUTPATIENT)
Dept: LAB | Facility: CLINIC | Age: 44
End: 2019-10-08
Payer: COMMERCIAL

## 2019-10-08 ENCOUNTER — OFFICE VISIT (OUTPATIENT)
Dept: NEUROLOGY | Facility: CLINIC | Age: 44
End: 2019-10-08
Attending: PSYCHIATRY & NEUROLOGY
Payer: COMMERCIAL

## 2019-10-08 VITALS
HEIGHT: 68 IN | HEART RATE: 120 BPM | SYSTOLIC BLOOD PRESSURE: 133 MMHG | WEIGHT: 222.8 LBS | DIASTOLIC BLOOD PRESSURE: 87 MMHG | BODY MASS INDEX: 33.77 KG/M2

## 2019-10-08 DIAGNOSIS — R93.0 ABNORMAL MRI OF HEAD: ICD-10-CM

## 2019-10-08 DIAGNOSIS — H47.9 UNSPECIFIED DISORDER OF VISUAL PATHWAYS: ICD-10-CM

## 2019-10-08 DIAGNOSIS — M79.2 NEUROPATHIC PAIN: ICD-10-CM

## 2019-10-08 DIAGNOSIS — R29.898 WEAKNESS OF HAND: ICD-10-CM

## 2019-10-08 DIAGNOSIS — R06.02 SHORTNESS OF BREATH: Primary | ICD-10-CM

## 2019-10-08 DIAGNOSIS — R06.00 DYSPNEA: Primary | ICD-10-CM

## 2019-10-08 LAB
ALBUMIN SERPL-MCNC: 4 G/DL (ref 3.4–5)
ALP SERPL-CCNC: 76 U/L (ref 40–150)
ALT SERPL W P-5'-P-CCNC: 44 U/L (ref 0–70)
ANION GAP SERPL CALCULATED.3IONS-SCNC: 5 MMOL/L (ref 3–14)
AST SERPL W P-5'-P-CCNC: 27 U/L (ref 0–45)
BASOPHILS # BLD AUTO: 0 10E9/L (ref 0–0.2)
BASOPHILS NFR BLD AUTO: 0.5 %
BILIRUB SERPL-MCNC: 0.5 MG/DL (ref 0.2–1.3)
BUN SERPL-MCNC: 26 MG/DL (ref 7–30)
CALCIUM SERPL-MCNC: 9.3 MG/DL (ref 8.5–10.1)
CHLORIDE SERPL-SCNC: 104 MMOL/L (ref 94–109)
CO2 SERPL-SCNC: 28 MMOL/L (ref 20–32)
CREAT SERPL-MCNC: 1.24 MG/DL (ref 0.66–1.25)
DIFFERENTIAL METHOD BLD: NORMAL
EOSINOPHIL # BLD AUTO: 0.1 10E9/L (ref 0–0.7)
EOSINOPHIL NFR BLD AUTO: 0.8 %
ERYTHROCYTE [DISTWIDTH] IN BLOOD BY AUTOMATED COUNT: 13 % (ref 10–15)
GFR SERPL CREATININE-BSD FRML MDRD: 69 ML/MIN/{1.73_M2}
GLUCOSE SERPL-MCNC: 81 MG/DL (ref 70–99)
HCT VFR BLD AUTO: 45.7 % (ref 40–53)
HGB BLD-MCNC: 14.9 G/DL (ref 13.3–17.7)
IMM GRANULOCYTES # BLD: 0 10E9/L (ref 0–0.4)
IMM GRANULOCYTES NFR BLD: 0.3 %
LYMPHOCYTES # BLD AUTO: 1.7 10E9/L (ref 0.8–5.3)
LYMPHOCYTES NFR BLD AUTO: 22.7 %
MCH RBC QN AUTO: 29.9 PG (ref 26.5–33)
MCHC RBC AUTO-ENTMCNC: 32.6 G/DL (ref 31.5–36.5)
MCV RBC AUTO: 92 FL (ref 78–100)
MONOCYTES # BLD AUTO: 0.6 10E9/L (ref 0–1.3)
MONOCYTES NFR BLD AUTO: 8 %
NEUTROPHILS # BLD AUTO: 5.1 10E9/L (ref 1.6–8.3)
NEUTROPHILS NFR BLD AUTO: 67.7 %
NRBC # BLD AUTO: 0 10*3/UL
NRBC BLD AUTO-RTO: 0 /100
PLATELET # BLD AUTO: 221 10E9/L (ref 150–450)
POTASSIUM SERPL-SCNC: 4 MMOL/L (ref 3.4–5.3)
PROT SERPL-MCNC: 7.4 G/DL (ref 6.8–8.8)
RBC # BLD AUTO: 4.99 10E12/L (ref 4.4–5.9)
SODIUM SERPL-SCNC: 137 MMOL/L (ref 133–144)
TSH SERPL DL<=0.005 MIU/L-ACNC: 1.58 MU/L (ref 0.4–4)
WBC # BLD AUTO: 7.6 10E9/L (ref 4–11)

## 2019-10-08 PROCEDURE — 85025 COMPLETE CBC W/AUTO DIFF WBC: CPT | Performed by: PSYCHIATRY & NEUROLOGY

## 2019-10-08 PROCEDURE — 84443 ASSAY THYROID STIM HORMONE: CPT | Performed by: PSYCHIATRY & NEUROLOGY

## 2019-10-08 PROCEDURE — 80053 COMPREHEN METABOLIC PANEL: CPT | Performed by: PSYCHIATRY & NEUROLOGY

## 2019-10-08 PROCEDURE — G0463 HOSPITAL OUTPT CLINIC VISIT: HCPCS | Mod: ZF

## 2019-10-08 PROCEDURE — 36415 COLL VENOUS BLD VENIPUNCTURE: CPT | Performed by: PSYCHIATRY & NEUROLOGY

## 2019-10-08 RX ORDER — PREGABALIN 75 MG/1
75 CAPSULE ORAL 3 TIMES DAILY
Qty: 270 CAPSULE | Refills: 3 | Status: SHIPPED | OUTPATIENT
Start: 2019-10-08 | End: 2020-10-02

## 2019-10-08 ASSESSMENT — MIFFLIN-ST. JEOR: SCORE: 1857.17

## 2019-10-08 ASSESSMENT — PAIN SCALES - GENERAL: PAINLEVEL: MODERATE PAIN (5)

## 2019-10-08 NOTE — PROGRESS NOTES
MULTIPLE SCLEROSIS CLINIC AT THE [facility]  FOLLOWUP/ESTABLISHED PATIENT VISIT      PPRINCIPAL NEUROLOGIC DIAGNOSIS: Multiple Sclerosis     Date of Onset: 2014  Date of Diagnosis: 3/2019  Initial Clinical Course: Primary progressive  Current Clinical Course: Primary progresssive  Past Disease Modifying Therapy(ies): NA  Current Disease Modifying Therapy(ies):NA  Most Recent MRI of the Brain: 10/8/19  Most Recent MRI of the Cervical Cord 10/18/19  Most Recent MRI of the Thoracic Cord: 1/14/19  Most Recent Lumbar Puncture: 1/29/19 ( 2wbc, 239 rbc, protein 41. Glucose 53,  unique 8 OCB)  Most Recent OCT: NA   Most Recent JCV: 3/5/19 positive  Most Recent Remote Hepatitis Panel: 3/5/19 negative  Most Recent VZV IgG: 3/5/19 positive  Most Recent TB Quant: 3/5/19 negative         CHIEF COMPLAINT: Review of diagnostic testing      INTERVAL HISTORY:    The patient reports that he is becoming winded more quickly. He used to be able to chop wood no problems and now is having difficulties with doing that. He is also having more muscle pains and weakness. He describes his pain as burning/ shooting stinging type pain. At its worse it is an 8/10. On his average days it is a 4/10. When the pain gets really bad, then he gets weakness. He reports that he gets legs and arms weakness. Nothing makes this pain. ONly lyrica makes it better. This has been steadily worsening over the last 6 months.     Issues with current MS therapy: Not on DMT    REVIEW OF SYSTEMS:    Mood: unchanged  Spasticity:worse and in legs feet and forearms  Bladder: none  Bowel: unchanged  Pain related to today's visit:reviewed on nursing intake documentation  Fatigue: worse. He has a lot of fatiuge  Sleep: He sleeps 4-7 hours per night. He wakes up half of nights secondary to pain  Memory/Concentration: unchanged, poor. He keeps losing his thought track. Difficulty remember things, names and details.      Otherwise 10 point ROS was neg other than  the symptoms noted above.    PAST HISTORY was reviewed and updated:      MEDICATIONS and ALLERGIES were reviewed and updated.    SOCIAL HISTORY was reviewed and updated:        EXAM:    PHYSICAL EXAMINATION:   VITAL SIGNS:  B/P: 133/87, T: Data Unavailable, P: 120, R: Data Unavailable    GENERAL: The patient is a well-nourished  who presents to the evaluation alone.  NEUROLOGIC:   MENTAL STATUS: Alert,awake and  oriented times four.   CRANIAL NERVES: Visual fields are full to confrontation. The pupils are  round and react to light and there is no Juanito Tracy pupil.Extraocular movements are  intact with no  internuclear ophthalmoplegia. No nystagmus. Facial strength and sensation are  normal. Hearing is  normal. Palate elevation and tongue protrusion are  normal.   POWER:     Motor    Upper      Right Left   Shoulder Abduction 5 5   Elbow Flexion 5 5   Elbow Extension 5 5   Wrist Extension 5 5   Digit Extension 5 5   Digit Flexion 5 5   APB 5 5   Tone 0 0   Lower       Right Left   Hip Flexion 5 5   Knee Extension 5 5   Knee Flexion 5 5   Foot Dorsiflexion 5 5   Foot Plantar Flexion 5 5   EH 5 5   Toe Flexion 5 5   Tone 0 0           Grade Description   0 No increase in muscle tone   1 Slight increase in muscle tone, manifested by a catch and release or by minimal resistance at the end of the range of motion when the affected part(s) is moved in flexion or extension   1+ Slight increase in muscle tone, manifested by a catch, followed by minimal resistance throughout the remainder (less than half) of the ROM   2 More marked increase in muscle tone through most of the ROM, but affected part(s) easily moved   3 Considerable increase in muscle tone, passive movement difficult   4 Affected part(s) rigid in flexion or extension           SENSORY:     Light touch:  Intact in all extremities and Pin Prick:  Intact in all extremities      MOTOR/CEREBELLAR:    Right Left   RRM 0 Normal 0 Normal   MARIANN 0 Normal 0 Normal   FTN 0  Normal 0 Normal   RRM 0 Normal 0 Normal   HKS 0 Normal 0 Normal           GAIT: Gait is   narrow-based and steady and the patient is  able to walk on heels, toes and in tandem without difficulty.    Romberg: Stable with eye(s) closed    RESULTS:  Monitoring labs:    No visits with results within 6 Month(s) from this visit.   Latest known visit with results is:   Orders Only on 03/05/2019   Component Date Value Ref Range Status     CRP Inflammation 03/05/2019 <2.9  0.0 - 8.0 mg/L Final     Sed Rate 03/05/2019 4  0 - 15 mm/h Final     CK Total 03/05/2019 97  30 - 300 U/L Final     IGM 03/05/2019 83  60 - 265 mg/dL Final     IGG 03/05/2019 925  695 - 1,620 mg/dL Final     IGA 03/05/2019 195  70 - 380 mg/dL Final   ]      MRI brain and cervical spine:    MRI Dated 10/8/19:  Mild T2 disease burden with  0 enhancion lesion(s).  compared to MRI on 1/14/19 there are   0 new T2 lesion(s).    ASSESSMENT/PLAN:  The patient is a 44-year-old gentleman with a past medical history of presumed diagnosis of primary progressive multiple sclerosis who is presenting today as a follow-up.  The patient's MRI of the brain and cervical spine were stable.  Overall, the patient continues to have worsening of his neuropathic pain.  Also the patient appears to be having worsening fatigue.  He does complain of shortness of breath.  Shortness of breath was worked up with a CT and EKG which were negative.  I will refer the patient to pulmonary just to make sure there is no underlying explanation for shortness of breath other than potentially just fatigue.  I will increase the patient's Lyrica to 75 mg 3 times daily.  I will the patient follow-up in 3 months.  I instructed the patient to call my office with any questions, concerns, issues or problems.    Increase lyrica to 75mg TID  Refer to pulmonary  Check CMP, CBC, and tSH    I spent 25 minutes in this visit, with >50% direct patient time spent counseling about prognosis, treatment options, and  coordination of care.    Sandip Costello MD, MD [facility]  Staff Neurologist   10/08/19       (Chart documentation was completed in part with Dragon voice-recognition software. Even though reviewed, some grammatical, spelling, and word errors may remain.)

## 2019-10-08 NOTE — LETTER
10/8/2019       RE: Adilson Burton  [id number] [facility] [place] [address]     Dear Colleague,    Thank you for referring your patient, Adilson Burton, to the [facility] MULTIPLE SCLEROSIS at [facility]. Please see a copy of my visit note below.    MULTIPLE SCLEROSIS CLINIC AT THE [facility]  FOLLOWUP/ESTABLISHED PATIENT VISIT      PPRINCIPAL NEUROLOGIC DIAGNOSIS: Multiple Sclerosis     Date of Onset: 2014  Date of Diagnosis: 3/2019  Initial Clinical Course: Primary progressive  Current Clinical Course: Primary progresssive  Past Disease Modifying Therapy(ies): NA  Current Disease Modifying Therapy(ies):NA  Most Recent MRI of the Brain:  10/8/19  Most Recent MRI of the Cervical Cord  10/18/19  Most Recent MRI of the Thoracic Cord: 1/14/19  Most Recent Lumbar Puncture: 1/29/19  ( 2wbc, 239 rbc, protein 41. Glucose 53,  unique 8 OCB)  Most Recent OCT: NA   Most Recent JCV: 3/5/19 positive  Most Recent Remote Hepatitis Panel: 3/5/19 negative  Most Recent VZV IgG: 3/5/19 positive  Most Recent TB Quant: 3/5/19 negative       CHIEF COMPLAINT: Review of diagnostic testing      INTERVAL HISTORY:    The patient reports that he is becoming winded more quickly. He used to be able to chop wood no problems and now is having difficulties with doing that. He is also having more muscle pains and weakness. He describes his pain as burning/ shooting stinging type pain. At its worse it is an 8/10. On his average days it is a 4/10. When the pain gets really bad, then he gets weakness. He reports that he gets legs and arms weakness. Nothing makes this pain. ONly lyrica makes it better. This has been steadily worsening over the last 6 months.     Issues with current MS therapy: Not on DMT    REVIEW OF SYSTEMS:    Mood: unchanged  Spasticity:worse and in legs feet and forearms  Bladder: none  Bowel: unchanged  Pain related to today's visit:reviewed on nursing intake documentation  Fatigue:  worse. He has a lot of fatiuge  Sleep: He sleeps 4-7 hours per night. He wakes up half of nights secondary to pain  Memory/Concentration: unchanged, poor. He keeps losing his thought track. Difficulty remember things, names and details.      Otherwise 10 point ROS was neg other than the symptoms noted above.    PAST HISTORY was reviewed and updated:      MEDICATIONS and ALLERGIES were reviewed and updated.    SOCIAL HISTORY was reviewed and updated:        EXAM:    PHYSICAL EXAMINATION:   VITAL SIGNS:  B/P: 133/87, T: Data Unavailable, P: 120, R: Data Unavailable    GENERAL: The patient is a well-nourished  who presents to the evaluation alone.  NEUROLOGIC:   MENTAL STATUS: Alert,awake and  oriented times four.   CRANIAL NERVES: Visual fields are full to confrontation. The pupils are  round and react to light and there is no Juanito Tracy pupil.Extraocular movements are  intact with no  internuclear ophthalmoplegia. No nystagmus. Facial strength and sensation are  normal. Hearing is  normal. Palate elevation and tongue protrusion are  normal.   POWER:     Motor    Upper      Right Left   Shoulder Abduction 5 5   Elbow Flexion 5 5   Elbow Extension 5 5   Wrist Extension 5 5   Digit Extension 5 5   Digit Flexion 5 5   APB 5 5   Tone 0 0   Lower       Right Left   Hip Flexion 5 5   Knee Extension 5 5   Knee Flexion 5 5   Foot Dorsiflexion 5 5   Foot Plantar Flexion 5 5   EH 5 5   Toe Flexion 5 5   Tone 0 0           Grade Description   0 No increase in muscle tone   1 Slight increase in muscle tone, manifested by a catch and release or by minimal resistance at the end of the range of motion when the affected part(s) is moved in flexion or extension   1+ Slight increase in muscle tone, manifested by a catch, followed by minimal resistance throughout the remainder (less than half) of the ROM   2 More marked increase in muscle tone through most of the ROM, but affected part(s) easily moved   3 Considerable increase in  muscle tone, passive movement difficult   4 Affected part(s) rigid in flexion or extension       SENSORY:     Light touch:  Intact in all extremities and Pin Prick:  Intact in all extremities      MOTOR/CEREBELLAR:    Right Left   RRM 0 Normal 0 Normal   MARIANN 0 Normal 0 Normal   FTN 0 Normal 0 Normal   RRM 0 Normal 0 Normal   HKS 0 Normal 0 Normal           GAIT: Gait is   narrow-based and steady and the patient is  able to walk on heels, toes and in tandem without difficulty.    Romberg: Stable with eye(s) closed    RESULTS:  Monitoring labs:    No visits with results within 6 Month(s) from this visit.   Latest known visit with results is:   Orders Only on 03/05/2019   Component Date Value Ref Range Status     CRP Inflammation 03/05/2019 <2.9  0.0 - 8.0 mg/L Final     Sed Rate 03/05/2019 4  0 - 15 mm/h Final     CK Total 03/05/2019 97  30 - 300 U/L Final     IGM 03/05/2019 83  60 - 265 mg/dL Final     IGG 03/05/2019 925  695 - 1,620 mg/dL Final     IGA 03/05/2019 195  70 - 380 mg/dL Final   ]      MRI brain and cervical spine:    MRI Dated 10/8/19:  Mild T2 disease burden with  0 enhancion lesion(s).  compared to MRI on 1/14/19 there are   0 new T2 lesion(s).    ASSESSMENT/PLAN:  The patient is a 44-year-old gentleman with a past medical history of presumed diagnosis of primary progressive multiple sclerosis who is presenting today as a follow-up.  The patient's MRI of the brain and cervical spine were stable.  Overall, the patient continues to have worsening of his neuropathic pain.  Also the patient appears to be having worsening fatigue.  He does complain of shortness of breath.  Shortness of breath was worked up with a CT and EKG which were negative.  I will refer the patient to pulmonary just to make sure there is no underlying explanation for shortness of breath other than potentially just fatigue.  I will increase the patient's Lyrica to 75 mg 3 times daily.  I will the patient follow-up in 3 months.  I  instructed the patient to call my office with any questions, concerns, issues or problems.    Increase lyrica to 75mg TID  Refer to pulmonary  Check CMP, CBC, and tSH    I spent 25 minutes in this visit, with >50% direct patient time spent counseling about prognosis, treatment options, and coordination of care.    Sandip Costello MD, MD [facility]  Staff Neurologist   10/08/19     (Chart documentation was completed in part with Dragon voice-recognition software. Even though reviewed, some grammatical, spelling, and word errors may remain.)

## 2019-10-08 NOTE — PATIENT INSTRUCTIONS
Refer to pulmonary    Increase Lyrica    Check blood work    Follow up in 3 month    You saw a neurology provider today at the [facility].  You may have also met with the MS RN Care Coordinator.  In order to get a message to your MS Center provider, you should contact [phone] option 3 for the triage nurse line.    You should contact us via this protocol if you have any of the following symptoms:    New or worsening neurologic symptoms that persist for 24-48 hours, such as:  o New onset of pain or marked worsening of pain  o Difficulty with speech, swallowing, or breathing  o New onset of vertigo or dizziness  o Change in bowel or bladder function (incontinence, difficulty urinating)    Increasing difficulty in self care    Marked changes in vision (double vision, blurred vision, graying of vision)    Change in mobility    Change in cognitive function    Falling    Worsening numbness, tingling or pain with a change in function    Worsening fatigue lasting more than 2 weeks  If you had labs completed today, we will contact you with the results.  If you are active in [facility] they will be released to you there.  Otherwise, your results will be provided to you via mail or telephone call.  Some results take up to 2 weeks for completion.  If you haven t heard anything about your lab results within 2 weeks, you can call or send a [facility] message to obtain your results.  If you have an MRI scheduled in the week or two prior to your next appointment, we will go over the results at your scheduled follow up appointment.  If you are not scheduled to see your MS Center provider within about 2 weeks after your MRI, please call or send a [facility] message to obtain your results if you haven t heard anything within 2 weeks.  Please be aware that it takes at least 5 business days after routine MRIs for your results to be reviewed by both the radiologist and your doctor.  MRIs completed at  facilities outside of the [facility] system take about 2 weeks in order for the MRI disc to be mailed to our clinic and uploaded into your medical record.    Prescription refills should be faxed to us by your pharmacy.  Our fax number for prescription refills is [phone].  Please do your best to come to your appointments, and to arrive 15 minutes early to allow time for checking in.  [facility] reserves the right to terminate care of established patients if a patient misses three or more appointments in a clinic without providing notification within a 12-month period.    Developing Your Care Team  Individuals living with chronic illnesses like MS may be unaware that they are at risk for the same range of medical problems as everyone else.  This is why you must establish a relationship with other health care providers in addition to your Multiple Sclerosis doctor.  It can be difficult at times to figure out whether a health concern is related to your MS, or whether it is related to something else, such as hormonal changes, pseduoexacerbations, changes in your core body temperature, flu-like reactions to interferons, exercise, or infections.  Urinary tract infections (UTIs) are common culprits that can cause fatigue, weakness, or other  MS attack -like symptoms without classic symptoms of a UTI.  For this reason, if you call or come in to discuss symptoms, you may be asked to get in touch with your primary provider or another specialist, so that you receive the comprehensive care you need.  What is Multiple Sclerosis (MS)?  MS is a disease in which the nerve tissues in the brain and/or spinal cord are attacked by immune cells in the body.  These immune cells are present in everyone, and their normal role is to fight off infections.  In people with MS, these cells change the way they function and cross into the nervous system.  Once there, they cause inflammation that damages the myelin (or  the protective coating of a nerve cell, much like the plastic covering on an electrical cord) and parts of the nerve cell itself.  So far, a clear cause for this immune system dysfunction has not been found.  MS often starts out as the  relapsing-remitting  form.  This means there are episodes when you have symptoms, and other times when you recover to normal or near-normal.  Over time, if the damage to the nervous system continues, the disease can cause additional disability, such as difficulty walking.  If the relapses and nerve damage can be prevented with available medications, many patients with MS can go many years between relapses and have relatively little disability.  Remember: MS is a condition that changes and must be evaluated on an ongoing basis!  What is a Relapse? (Also called flare-ups, attacks, or exacerbations)  Relapses are due to the occurrence of inflammation in some part of the brain and/or spinal cord.  A relapse is new or recurrent symptoms which persist for at least 24 hours and sometimes worsen over 48 hours.  New symptoms need to be  by at least 1 month in order to be considered separate relapses. Most of the time, symptoms reach their maximal intensity within 2 weeks and then begin to slowly resolve.  At times, your symptoms may not recover fully for up to 6 months, depending on the severity of the episode.  The frequency of relapses is generally higher early in the disease, but can vary greatly among individuals with MS.  Improvement of symptoms for an individual is unpredictable with each relapse.    It is important to remember that an increase in symptoms and changes in function may not necessarily be a relapse.  There are other factors that contribute to such changes, such as hot weather, increased body temperature, infection/illness, stress and sleep deprivation.  The worsening of symptoms may feel like a relapse when in reality it is not.  These episodes are referred to as  pseudorelapses.  Once the underlying cause is addressed, symptoms usually fade away and you feel better.  If you experience a worsening of symptoms that lasts more than 48 hours and does not improve with cooling down, decreasing stress, or treatment of an infection, please call us and we can help to better determine whether you are having a pseudorelapse versus a relapse.

## 2019-10-09 NOTE — TELEPHONE ENCOUNTER
RECORDS RECEIVED FROM: Internal    DATE RECEIVED: 12/4/19    NOTES STATUS DETAILS   OFFICE NOTE from referring provider Internal 10/8/19 Sandip Winter   OFFICE NOTE from other specialist Internal 10/8/19 Sandip Winter   DISCHARGE SUMMARY from hospital N/A    DISCHARGE REPORT from the ER N/A    MEDICATION LIST Internal    IMAGING  (NEED IMAGES AND REPORTS)     CT SCAN Received 7.24.19   CHEST XRAY (CXR) In process 12/4/19 Scheduled      TESTS     PULMONARY FUNCTION TESTING (PFT) In process 12/4/19 Scheduled        Action 10/9/19   Action Taken Records request sent out to [facility] for CT Chest on 7/24/19          Action 11.25.19   Action Taken 2nd  Request sent out to [facility] fax# [phone] for CXR 7/24/19        Action 12.2.19 MJ 12:49 PM   Action Taken Received CD from [facility]. Gave to Aminta to be uploaded into system.

## 2019-12-02 ENCOUNTER — DOCUMENTATION ONLY (OUTPATIENT)
Dept: CARE COORDINATION | Facility: CLINIC | Age: 44
End: 2019-12-02

## 2019-12-03 ASSESSMENT — ENCOUNTER SYMPTOMS
JAUNDICE: 0
WEAKNESS: 1
DIARRHEA: 0
HEADACHES: 1
DIFFICULTY URINATING: 1
WHEEZING: 0
LOSS OF CONSCIOUSNESS: 0
ALTERED TEMPERATURE REGULATION: 1
BLOOD IN STOOL: 0
BLOATING: 0
POSTURAL DYSPNEA: 0
RECTAL PAIN: 0
FEVER: 0
NIGHT SWEATS: 1
SLEEP DISTURBANCES DUE TO BREATHING: 0
NUMBNESS: 1
HEARTBURN: 1
EYE IRRITATION: 1
HEMOPTYSIS: 0
ABDOMINAL PAIN: 0
BOWEL INCONTINENCE: 0
DOUBLE VISION: 0
EXERCISE INTOLERANCE: 1
HYPERTENSION: 0
DECREASED APPETITE: 0
HEMATURIA: 0
HYPOTENSION: 0
DYSPNEA ON EXERTION: 1
SEIZURES: 0
FATIGUE: 1
SNORES LOUDLY: 0
VOMITING: 0
ORTHOPNEA: 0
INCREASED ENERGY: 1
ARTHRALGIAS: 1
PARALYSIS: 0
CHILLS: 0
SPUTUM PRODUCTION: 0
FLANK PAIN: 0
DIZZINESS: 1
TINGLING: 1
MUSCLE CRAMPS: 1
TREMORS: 1
DISTURBANCES IN COORDINATION: 1
EYE REDNESS: 1
MUSCLE WEAKNESS: 1
MYALGIAS: 1
NAUSEA: 0
POLYPHAGIA: 1
EYE PAIN: 1
CONSTIPATION: 0
DYSURIA: 0
WEIGHT LOSS: 0
LEG PAIN: 1
BACK PAIN: 0
COUGH DISTURBING SLEEP: 0
WEIGHT GAIN: 1
STIFFNESS: 1
PALPITATIONS: 1
POLYDIPSIA: 0
SYNCOPE: 0
SPEECH CHANGE: 1
HALLUCINATIONS: 0
JOINT SWELLING: 0
MEMORY LOSS: 1
NECK PAIN: 1
LIGHT-HEADEDNESS: 1
EYE WATERING: 1
SHORTNESS OF BREATH: 1
COUGH: 1

## 2019-12-04 ENCOUNTER — OFFICE VISIT (OUTPATIENT)
Dept: PULMONOLOGY | Facility: CLINIC | Age: 44
End: 2019-12-04
Payer: COMMERCIAL

## 2019-12-04 ENCOUNTER — PRE VISIT (OUTPATIENT)
Dept: PULMONOLOGY | Facility: CLINIC | Age: 44
End: 2019-12-04

## 2019-12-04 ENCOUNTER — ANCILLARY PROCEDURE (OUTPATIENT)
Dept: GENERAL RADIOLOGY | Facility: CLINIC | Age: 44
End: 2019-12-04
Payer: COMMERCIAL

## 2019-12-04 VITALS
DIASTOLIC BLOOD PRESSURE: 87 MMHG | HEIGHT: 68 IN | OXYGEN SATURATION: 96 % | RESPIRATION RATE: 17 BRPM | WEIGHT: 225 LBS | SYSTOLIC BLOOD PRESSURE: 138 MMHG | HEART RATE: 76 BPM | BODY MASS INDEX: 34.1 KG/M2

## 2019-12-04 DIAGNOSIS — H47.9 UNSPECIFIED DISORDER OF VISUAL PATHWAYS: ICD-10-CM

## 2019-12-04 DIAGNOSIS — R06.00 DYSPNEA: ICD-10-CM

## 2019-12-04 DIAGNOSIS — R06.02 SHORTNESS OF BREATH: Primary | ICD-10-CM

## 2019-12-04 PROCEDURE — G0463 HOSPITAL OUTPT CLINIC VISIT: HCPCS | Mod: ZF

## 2019-12-04 ASSESSMENT — MIFFLIN-ST. JEOR: SCORE: 1867.15

## 2019-12-04 ASSESSMENT — PAIN SCALES - GENERAL: PAINLEVEL: NO PAIN (0)

## 2019-12-04 NOTE — PROGRESS NOTES
Initial Pulmonary Consult     Chief Complaint: shortness of breath     HPI: 44 year old man with a history of primary progressive MS diagnosed 3/2019 and RNY gastric bypass 2008. He had symptoms for quite a few years prior to diagnosis of MS - pain, numbness, tingling, fatigue, eye pain, headaches, memory issues, muscle spasms. At his last visit to neurology his MRI brain and spine were stable. He had worsening pain, fatigue, and shortness of breath. He had a CT and EKG for his shortness of breath, which were negative, and was referred to pulmonary to ensure he had no primary pulmonary diagnoses.     He is having shortness of breath with any exertion - going up stairs, physical labor, sometimes while on the couch watching TV, occasionally has some gasping while sitting. However, at times remains able to chop a cord of wood. He has some profuse sweating with exertion that has been worse the last few months. He has some pain in the arms with the sweating and shortness of breath. Night sweats for a long time. Symptoms have been progressively worse, particularly over the last year.     He has had some intermittent pain at the sternum that feels like heartburn. He does not cough very often. When he does it is dry. Occasional swelling in the legs.     He has gained weight over the last year or so. 180 to 225 lbs. He has difficulty sleeping due to neuropathy, particularly in the neck, shoulders, and legs. He has a lot of fatigue. He has never had a sleep study and is not interested in having one.    CT chest 7/2019:  IMPRESSION:  1. No sternal mass identified.  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  This does appear to be similar to previous.  2. Small pulmonary nodule right lower lobe, stable since 2015.  No further workup required.  3. Coronary artery calcifications are noted.  This can be a marker for coronary artery disease.  4. Gynecomastia.  5. No evidence for acute intrathoracic  "process.    ROS: Remainder of 10 system ROS negative other than noted elsewhere in this note.     Medications:   Current Outpatient Medications   Medication     Cholecalciferol (D 1000) 1000 units CAPS     DULoxetine (CYMBALTA) 20 MG capsule     ibuprofen (ADVIL/MOTRIN) 800 MG tablet     pregabalin (LYRICA) 75 MG capsule       Past medical history:   1. RNY gastric bypass .   2. GERD.   3. MS    Past surgical History:   1. Cervical fusion   2. Scrotum surgery for removal of cysts  3. Fatty tumor removed from behind knee     Social History:   He quit smoking 3 years ago, smoked 1 ppd for 20 years. Very little etoh use. No other drug use. No vaping. Chews tobacco. 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 They have a pet dog, no birds.     Family History:   Brother with MS  Aunt  of lung cancer  No other lung disease in the family  Strong family history of cancer  Both grandfathers had coronary artery disease and myocardial infarctions     Physical exam:  /87   Pulse 76   Resp 17   Ht 1.715 m (5' 7.5\")   Wt 102.1 kg (225 lb)   SpO2 96%   BMI 34.72 kg/m    Gen: well appearing middle aged man accompanied by his wife.   HEENT: Clear oropharynx.   Neck: No jvd.   CV: RRR. No mgr.   Pulm CTAB.  Abd:  + BS, soft NT, no organomegaly  Ext: no edema.  Neuro: no obvious deficits  Psych: pleasant and appropriate, normal affect   Skin:  No concerning lesions    Labs:   Bicarb normal previously  Eosinophils 100 in 10/2019    Imaging:    CXR (personally reviewed and interpreted): both diaphragms are elevated. No focal opacities.     Pulmonary function testing:   Normal spirometry, lung volumes, diffusing capacity.     Assessment and plan:  44 year old man with a history of MS and cervical spine fusion here for evaluation of shortness of " breath. Pulmonary function testing is normal. Imaging shows elevated diaphragms which may require further workup in the future, however most concerning is the diaphoresis and pain in the arms with exertion which could indicated ischemia (also had coronary artery calcifications on CT).     - start baby aspirin 81 mg every day   - hold on heavy exertion until after cardiac workup   - stress test and echocardiogram ordered - he prefers to have done closer to home and was provided with hard copies of orders   - follow up in 2 months, if still short of breath will workup further with sitting and supine spirometry    Patient was seen and staffed with Dr. Nation.     Brown Ortiz MD  Pulmonary/Critical Care Fellow       Attending note:  Patient seen, examined and discussed with Dr. Ortiz. All data reviewed.  Agree with assessment and plan as outlined in the above note. Exertional shortness of breath concerning for cardiac cause.  Elevated diaphragms may be secondary to phrenic nerve compression in setting of cervical spine disease.  Needs cardiac work-up, stressed to patient not to over-exert himself prior to cardiac work-up, start ASA. If work-up is negative would examine functional response of diaphragms.    Maranda Nation MD  [phone]

## 2019-12-04 NOTE — PATIENT INSTRUCTIONS
1. Start taking a baby aspirin (81 mg) every day.   2. Hold off on heavy exertion until after the stress test.   3. We will get you scheduled for a stress test and an echocardiogram.   4. Follow up in 2 months.

## 2019-12-05 LAB
DLCOUNC-%PRED-PRE: 115 %
DLCOUNC-PRE: 31.96 ML/MIN/MMHG
DLCOUNC-PRED: 27.79 ML/MIN/MMHG
ERV-%PRED-PRE: 55 %
ERV-PRE: 0.48 L
ERV-PRED: 0.86 L
EXPTIME-PRE: 7.13 SEC
FEF2575-%PRED-PRE: 77 %
FEF2575-PRE: 2.79 L/SEC
FEF2575-PRED: 3.57 L/SEC
FEFMAX-%PRED-PRE: 99 %
FEFMAX-PRE: 9.38 L/SEC
FEFMAX-PRED: 9.44 L/SEC
FEV1-%PRED-PRE: 85 %
FEV1-PRE: 3.19 L
FEV1FEV6-PRE: 79 %
FEV1FEV6-PRED: 81 %
FEV1FVC-PRE: 79 %
FEV1FVC-PRED: 80 %
FEV1SVC-PRE: 74 %
FEV1SVC-PRED: 77 %
FIFMAX-PRE: 8.24 L/SEC
FRCPLETH-%PRED-PRE: 67 %
FRCPLETH-PRE: 2.25 L
FRCPLETH-PRED: 3.34 L
FVC-%PRED-PRE: 86 %
FVC-PRE: 4.04 L
FVC-PRED: 4.67 L
IC-%PRED-PRE: 95 %
IC-PRE: 3.82 L
IC-PRED: 4.01 L
RVPLETH-%PRED-PRE: 87 %
RVPLETH-PRE: 1.77 L
RVPLETH-PRED: 2.03 L
TLCPLETH-%PRED-PRE: 91 %
TLCPLETH-PRE: 6.07 L
TLCPLETH-PRED: 6.62 L
VA-%PRED-PRE: 87 %
VA-PRE: 5.35 L
VC-%PRED-PRE: 88 %
VC-PRE: 4.3 L
VC-PRED: 4.88 L

## 2019-12-10 ENCOUNTER — TRANSFERRED RECORDS (OUTPATIENT)
Dept: HEALTH INFORMATION MANAGEMENT | Facility: CLINIC | Age: 44
End: 2019-12-10

## 2020-03-11 ENCOUNTER — HEALTH MAINTENANCE LETTER (OUTPATIENT)
Age: 45
End: 2020-03-11

## 2020-05-04 DIAGNOSIS — R93.0 ABNORMAL MRI OF HEAD: ICD-10-CM

## 2020-05-04 DIAGNOSIS — M79.2 NEUROPATHIC PAIN: ICD-10-CM

## 2020-05-04 DIAGNOSIS — H47.9 UNSPECIFIED DISORDER OF VISUAL PATHWAYS: ICD-10-CM

## 2020-05-04 NOTE — TELEPHONE ENCOUNTER
Continue Combigan BID OS. Received refill request for pregabalin from [facility]; Patient was last seen on 10/08/2019 and has follow up appointment not schedule yet. Pended to ms pool for review/approval    Sent patient a [employer] message on 01/20/2020 asking him to schedule with another provider (dr colon)  and another one today    Angelita Graff MA

## 2020-05-06 RX ORDER — PREGABALIN 75 MG/1
75 CAPSULE ORAL 3 TIMES DAILY
Qty: 270 CAPSULE | Refills: 3 | OUTPATIENT
Start: 2020-05-06

## 2021-01-03 ENCOUNTER — HEALTH MAINTENANCE LETTER (OUTPATIENT)
Age: 46
End: 2021-01-03

## 2021-04-25 ENCOUNTER — HEALTH MAINTENANCE LETTER (OUTPATIENT)
Age: 46
End: 2021-04-25

## 2021-10-10 ENCOUNTER — HEALTH MAINTENANCE LETTER (OUTPATIENT)
Age: 46
End: 2021-10-10

## 2022-05-21 ENCOUNTER — HEALTH MAINTENANCE LETTER (OUTPATIENT)
Age: 47
End: 2022-05-21

## 2022-09-18 ENCOUNTER — HEALTH MAINTENANCE LETTER (OUTPATIENT)
Age: 47
End: 2022-09-18

## 2023-06-04 ENCOUNTER — HEALTH MAINTENANCE LETTER (OUTPATIENT)
Age: 48
End: 2023-06-04